# Patient Record
Sex: MALE | Race: WHITE
[De-identification: names, ages, dates, MRNs, and addresses within clinical notes are randomized per-mention and may not be internally consistent; named-entity substitution may affect disease eponyms.]

---

## 2018-02-23 ENCOUNTER — HOSPITAL ENCOUNTER (OUTPATIENT)
Dept: HOSPITAL 76 - EMS | Age: 78
Discharge: ANOTHER HOSPITAL | End: 2018-02-23
Attending: SURGERY
Payer: MEDICARE

## 2018-02-23 ENCOUNTER — HOSPITAL ENCOUNTER (EMERGENCY)
Dept: HOSPITAL 76 - ED | Age: 78
LOS: 1 days | Discharge: HOME | End: 2018-02-24
Payer: MEDICARE

## 2018-02-23 DIAGNOSIS — I10: ICD-10-CM

## 2018-02-23 DIAGNOSIS — R53.1: Primary | ICD-10-CM

## 2018-02-23 DIAGNOSIS — Z86.73: ICD-10-CM

## 2018-02-23 LAB
ALBUMIN DIAFP-MCNC: 3 G/DL (ref 3.2–5.5)
ALBUMIN/GLOB SERPL: 1 {RATIO} (ref 1–2.2)
ALP SERPL-CCNC: 43 IU/L (ref 42–121)
ALT SERPL W P-5'-P-CCNC: 10 IU/L (ref 10–60)
ANION GAP SERPL CALCULATED.4IONS-SCNC: 10 MMOL/L (ref 6–13)
AST SERPL W P-5'-P-CCNC: < 10 IU/L (ref 10–42)
BASOPHILS NFR BLD AUTO: 0 10^3/UL (ref 0–0.1)
BASOPHILS NFR BLD AUTO: 0.3 %
BILIRUB BLD-MCNC: 0.6 MG/DL (ref 0.2–1)
BUN SERPL-MCNC: 20 MG/DL (ref 6–20)
CALCIUM UR-MCNC: 9.9 MG/DL (ref 8.5–10.3)
CHLORIDE SERPL-SCNC: 99 MMOL/L (ref 101–111)
CLARITY UR REFRACT.AUTO: (no result)
CO2 SERPL-SCNC: 30 MMOL/L (ref 21–32)
CREAT SERPLBLD-SCNC: 1 MG/DL (ref 0.6–1.2)
EOSINOPHIL # BLD AUTO: 0 10^3/UL (ref 0–0.7)
EOSINOPHIL NFR BLD AUTO: 0.3 %
ERYTHROCYTE [DISTWIDTH] IN BLOOD BY AUTOMATED COUNT: 16.1 % (ref 12–15)
GFRSERPLBLD MDRD-ARVRAT: 73 ML/MIN/{1.73_M2} (ref 89–?)
GLOBULIN SER-MCNC: 3.1 G/DL (ref 2.1–4.2)
GLUCOSE SERPL-MCNC: 111 MG/DL (ref 70–100)
GLUCOSE UR QL STRIP.AUTO: NEGATIVE MG/DL
HGB UR QL STRIP: 11.2 G/DL (ref 14–18)
INR PPP: 1.1 (ref 0.8–1.2)
KETONES UR QL STRIP.AUTO: NEGATIVE MG/DL
LIPASE SERPL-CCNC: < 10 U/L (ref 22–51)
LYMPHOCYTES # SPEC AUTO: 0.4 10^3/UL (ref 1.5–3.5)
LYMPHOCYTES NFR BLD AUTO: 5.9 %
MCH RBC QN AUTO: 29.5 PG (ref 27–31)
MCHC RBC AUTO-ENTMCNC: 33 G/DL (ref 32–36)
MCV RBC AUTO: 89.3 FL (ref 80–94)
MONOCYTES # BLD AUTO: 0.8 10^3/UL (ref 0–1)
MONOCYTES NFR BLD AUTO: 11.4 %
NEUTROPHILS # BLD AUTO: 5.5 10^3/UL (ref 1.5–6.6)
NEUTROPHILS # SNV AUTO: 6.8 X10^3/UL (ref 4.8–10.8)
NEUTROPHILS NFR BLD AUTO: 82.1 %
NITRITE UR QL STRIP.AUTO: NEGATIVE
PDW BLD AUTO: 7 FL (ref 7.4–11.4)
PH UR STRIP.AUTO: 7 PH (ref 5–7.5)
PLATELET # BLD: 269 10^3/UL (ref 130–450)
PROT SPEC-MCNC: 6.1 G/DL (ref 6.7–8.2)
PROT UR STRIP.AUTO-MCNC: NEGATIVE MG/DL
PROTHROM ACT/NOR PPP: 12.6 SECS (ref 9.9–12.6)
RBC # UR STRIP.AUTO: (no result) /UL
RBC # URNS HPF: (no result) /HPF (ref 0–5)
RBC MAR: 3.8 10^6/UL (ref 4.7–6.1)
SODIUM SERPLBLD-SCNC: 139 MMOL/L (ref 135–145)
SP GR UR STRIP.AUTO: 1.01 (ref 1–1.03)
SQUAMOUS URNS QL MICRO: (no result)
UROBILINOGEN UR QL STRIP.AUTO: (no result) E.U./DL
UROBILINOGEN UR STRIP.AUTO-MCNC: NEGATIVE MG/DL

## 2018-02-23 PROCEDURE — 36415 COLL VENOUS BLD VENIPUNCTURE: CPT

## 2018-02-23 PROCEDURE — 83690 ASSAY OF LIPASE: CPT

## 2018-02-23 PROCEDURE — 99284 EMERGENCY DEPT VISIT MOD MDM: CPT

## 2018-02-23 PROCEDURE — 81003 URINALYSIS AUTO W/O SCOPE: CPT

## 2018-02-23 PROCEDURE — 70460 CT HEAD/BRAIN W/DYE: CPT

## 2018-02-23 PROCEDURE — 99285 EMERGENCY DEPT VISIT HI MDM: CPT

## 2018-02-23 PROCEDURE — 71046 X-RAY EXAM CHEST 2 VIEWS: CPT

## 2018-02-23 PROCEDURE — 80053 COMPREHEN METABOLIC PANEL: CPT

## 2018-02-23 PROCEDURE — 84484 ASSAY OF TROPONIN QUANT: CPT

## 2018-02-23 PROCEDURE — 51701 INSERT BLADDER CATHETER: CPT

## 2018-02-23 PROCEDURE — 87086 URINE CULTURE/COLONY COUNT: CPT

## 2018-02-23 PROCEDURE — 85610 PROTHROMBIN TIME: CPT

## 2018-02-23 PROCEDURE — 85025 COMPLETE CBC W/AUTO DIFF WBC: CPT

## 2018-02-23 PROCEDURE — 81001 URINALYSIS AUTO W/SCOPE: CPT

## 2018-02-23 NOTE — XRAY PRELIMINARY REPORT
Accession: [id number]

Exam: XR CHEST 2 VIEW X-RAY

 

IMPRESSION: 

1. Focal posterior airspace opacity best seen on the lateral view, possibly pneumonia or neoplasm. Fo
llow-up imaging is needed to show resolution. 

2. Borderline heart size.

 

[facility]

 

SITE ID: [id number]

## 2018-02-23 NOTE — CT REPORT
EXAM:

CT HEAD WITH CONTRAST

 

EXAM DATE: 2/23/2018 10:42 PM.

 

CLINICAL HISTORY: Weakness, altered mental status. History of lung cancer.

 

COMPARISON: MR brain 01/19/2013.

 

TECHNIQUE: Multiaxial CT images were obtained from the foramen magnum to the vertex. Reformats: Coron
al. IV contrast: 80 cc Isovue-300.

 

In accordance with CT protocol optimization, one or more of the following dose reduction techniques w
ere utilized for this exam: automated exposure control, adjustment of mA and/or KV based on patient s
ize, or use of iterative reconstructive technique.

 

FINDINGS:

Compared to 2013, interval placement of a right frontal  shunt. Catheter tip minimally protrudes in
to the left frontal horn. Ventricular size is unchanged.

 

Moderately extensive white matter hypodensity, likely small vessel ischemia. The extent is similar.

 

Old appearing lacunar infarct within the left adryan, this was a recent ischemic event on the prior MR.


 

No obvious hemorrhage. No space-occupying lesion, extracerebral fluid collection or CT evidence of ne
w infarct.

 

Rounded subcutaneous nodule in the right parietal scalp measures 18 mm in diameter, this appears to h
ave increased slightly in size. Prior measurements on the order of 13 mm.

 

IMPRESSION: 

Interval placement of a right frontal  shunt catheter, ventricular size is unchanged. 

 

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 No acute intracranial process identified. 

 

No abnormal enhancement to suggest metastatic disease.

 

Enlarging right subcutaneous nodule over the right parietal region, possible sebaceous cyst. Please c
nicole clinically.

 

[facility]

Referring Provider Line: [phone]

 

SITE ID: [id number]

## 2018-02-23 NOTE — ED PHYSICIAN DOCUMENTATION
History of Present Illness





- Stated complaint


Stated Complaint: WEAK





- Chief complaint


Chief Complaint: Neuro





- History obtained from


History obtained from: Patient, Family





- History of Present Illness


Timing: Enter  time (16:00), Today


Improved by: no ameliorating factors


Worsened by: no exacerbating factors





- Additonal information


Additional information: 





c/o generalized weakness that started approximately 4 PM today. Wife says 

patient usually can ambulate slowly on his own and get out of bed, but since 4 

PM, he has been unable to do either of these things due to generalized weakness

, most noticeable BLE. Seen by his rad/onc today and plan is to get MRI brain 

tomorrow (from wife's HPI, sounds like this was more for intermittent vomiting 

and possibly surveillance; he did not develop the weakness until after he met 

with his doctor today)





Review of Systems


Constitutional: denies: Fever, Chills, Sweats


Eyes: reports: Reviewed and negative


Cardiac: reports: Reviewed and negative


Respiratory: reports: Reviewed and negative


GI: reports: Nausea, Vomiting.  denies: Abdominal Pain, Constipation, Diarrhea


: denies: Dysuria, Frequency


Musculoskeletal: reports: Reviewed and negative


Neurologic: reports: Generalized weakness.  denies: Focal weakness, Numbness, 

Confused, Altered mental status, Headache





PD PAST MEDICAL HISTORY





- Past Medical History


Past Medical History: Yes


Cardiovascular: Hypertension


Respiratory: Pneumonia


Neuro: CVA


Psych: Depression


Musculoskeletal: Osteoarthritis, Rheumatoid arthritis





- Past Surgical History


Past Surgical History: Yes


Derm: Skin cancer surgery





- Present Medications


Home Medications: 


 Ambulatory Orders











 Medication  Instructions  Recorded  Confirmed


 


Atorvastatin [Lipitor] 20 mg PO DAILY 10/19/14 10/19/14


 


Calcium Carbonate/Vitamin D3 1 tab PO BID 10/19/14 10/19/14





[Calcium 500 + D Tablet]   


 


Citalopram [CeleXA] 20 mg PO DAILY 10/19/14 10/19/14


 


Desonide  10/19/14 10/19/14


 


Fluorouracil [Efudex]  10/19/14 10/19/14


 


Omeprazole [PriLOSEC] 10 mg DAILY 10/19/14 10/19/14


 


Potassium Chloride [K-Dur] 10 meq DAILY 10/19/14 10/19/14


 


predniSONE [Deltasone] 6 mg DAILY 10/19/14 10/19/14


 


Amlodipine Besylate 1 tab PO DAILY 02/23/18 02/23/18


 


Carvedilol 1 tab PO BID 02/23/18 02/23/18


 


Clopidogrel Bisulfate [Clopidogrel] 1 tab PO DAILY 02/23/18 02/23/18


 


Docusate Sodium 250Mg Capsule 1 cap PO DAILY 02/23/18 02/23/18





[Colace 250Mg Capsule]   


 


Fluticasone Propionate [Flovent 2 spray BETTYE DAILY 02/23/18 02/23/18





Diskus]   


 


Guaifenesin [Mucinex] 600 mg PO BID 02/23/18 02/23/18


 


Hydrocodone/Acetaminophen [Vicodin 1 tab PO BID PRN 02/23/18 02/23/18





5-300 mg Tablet]   


 


Ibandronate Sodium 1 tab PO 02/23/18 


 


Levothyroxine Sodium 1 tab PO DAILY 02/23/18 02/23/18


 


Lidocaine Patch 5% [Lidoderm Patch] 1 patch TOP DAILY 02/23/18 02/23/18


 


Mirabegron [Myrbetriq] 1 tab PO DAILY 02/23/18 02/23/18


 


Valsartan 1 tab PO BID 02/23/18 02/23/18


 


hydroCHLOROthiazide 1 tab PO DAILY 02/23/18 02/23/18





[Hydrochlorothiazide]   














- Allergies


Allergies/Adverse Reactions: 


 Allergies











Allergy/AdvReac Type Severity Reaction Status Date / Time


 


No Known Drug Allergies Allergy   Verified 02/23/18 20:02














- Social History


Does the pt smoke?: No


Smoking Status: Never smoker


Does the pt drink ETOH?: Yes


Does the pt have substance abuse?: No





- Immunizations


Immunizations are current?: Yes





- POLST


Patient has POLST: No





PD ED PE NORMAL





- Vitals


Vital signs reviewed: Yes





- General


General: Alert and oriented X 3, No acute distress, Well developed/nourished, 

Other (answers slowly but appropriately)





- HEENT


HEENT: PERRL, EOMI, Moist mucous membranes





- Neck


Neck: Supple, no meningeal sign





- Cardiac


Cardiac: RRR, No murmur, No gallop, No rub





- Respiratory


Respiratory: No respiratory distress, Clear bilaterally





- Abdomen


Abdomen: Soft, Non tender





- Back


Back: No CVA TTP





- Derm


Derm: Normal color, Warm and dry





- Neuro


Neuro: Alert and oriented X 3, CNs 2-12 intact, No motor deficit (5/5 bilateral 

plantarflexion, 4/5 bilateral iliopsoas (leg raise)), No sensory deficit, 

Normal speech, Other (2+/4 bilateral patellar DTR)


Eye Opening: Spontaneous


Motor: Obeys Commands


Verbal: Oriented


GCS Score: 15





Results





- Vitals


Vitals: 


 Oxygen











O2 Source                      Room air


 


Oxygen Flow Rate               2

















- Labs


Labs: 


 Microbiology











 02/23/18 20:50 Urine Culture - Final





 Urine,Catheterized    No growth








 Laboratory Tests











  02/23/18 02/23/18 02/23/18





  19:45 19:45 19:45


 


WBC  6.8  


 


RBC  3.80 L  


 


Hgb  11.2 L  


 


Hct  33.9 L  


 


MCV  89.3  


 


MCH  29.5  


 


MCHC  33.0  


 


RDW  16.1 H  


 


Plt Count  269  


 


MPV  7.0 L  


 


Neut #  5.5  


 


Lymph #  0.4 L  


 


Mono #  0.8  


 


Eos #  0.0  


 


Baso #  0.0  


 


Absolute Nucleated RBC  0.00  


 


Nucleated RBC %  0.1  


 


PT   12.6 


 


INR   1.1 


 


Sodium    139


 


Potassium    4.1


 


Chloride    99 L


 


Carbon Dioxide    30


 


Anion Gap    10.0


 


BUN    20


 


Creatinine    1.0


 


Estimated GFR (MDRD)    73 L


 


Glucose    111 H


 


Calcium    9.9


 


Total Bilirubin    0.6


 


AST    < 10 L


 


ALT    10


 


Alkaline Phosphatase    43


 


Troponin I   


 


Total Protein    6.1 L


 


Albumin    3.0 L


 


Globulin    3.1


 


Albumin/Globulin Ratio    1.0


 


Lipase    < 10 L


 


Urine Color   


 


Urine Clarity   


 


Urine pH   


 


Ur Specific Gravity   


 


Urine Protein   


 


Urine Glucose (UA)   


 


Urine Ketones   


 


Urine Occult Blood   


 


Urine Nitrite   


 


Urine Bilirubin   


 


Urine Urobilinogen   


 


Ur Leukocyte Esterase   


 


Urine RBC   


 


Urine WBC   


 


Ur Squamous Epith Cells   


 


Amorphous Sediment   


 


Urine Bacteria   


 


Ur Microscopic Review   


 


Urine Culture Comments   














  02/23/18 02/23/18





  19:45 20:50


 


WBC  


 


RBC  


 


Hgb  


 


Hct  


 


MCV  


 


MCH  


 


MCHC  


 


RDW  


 


Plt Count  


 


MPV  


 


Neut #  


 


Lymph #  


 


Mono #  


 


Eos #  


 


Baso #  


 


Absolute Nucleated RBC  


 


Nucleated RBC %  


 


PT  


 


INR  


 


Sodium  


 


Potassium  


 


Chloride  


 


Carbon Dioxide  


 


Anion Gap  


 


BUN  


 


Creatinine  


 


Estimated GFR (MDRD)  


 


Glucose  


 


Calcium  


 


Total Bilirubin  


 


AST  


 


ALT  


 


Alkaline Phosphatase  


 


Troponin I  < 0.04 


 


Total Protein  


 


Albumin  


 


Globulin  


 


Albumin/Globulin Ratio  


 


Lipase  


 


Urine Color   YELLOW


 


Urine Clarity   HAZY


 


Urine pH   7.0


 


Ur Specific Gravity   1.015


 


Urine Protein   NEGATIVE


 


Urine Glucose (UA)   NEGATIVE


 


Urine Ketones   NEGATIVE


 


Urine Occult Blood   TRACE-INTA


 


Urine Nitrite   NEGATIVE


 


Urine Bilirubin   NEGATIVE


 


Urine Urobilinogen   0.2 (NORMAL)


 


Ur Leukocyte Esterase   NEGATIVE


 


Urine RBC   0-5


 


Urine WBC   0-3


 


Ur Squamous Epith Cells   FEW Squamous


 


Amorphous Sediment   Moderate


 


Urine Bacteria   Moderate H


 


Ur Microscopic Review   INDICATED


 


Urine Culture Comments   INDICATED














- Rads (name of study)


  ** CT head w/contrast


Radiology: Prelim report reviewed, See rad report





  ** chest xray


Radiology: Prelim report reviewed, See rad report





PD MEDICAL DECISION MAKING





- ED course


Complexity details: reviewed results, re-evaluated patient, considered 

differential, d/w patient


ED course: 





No vomiting during ED stay. Tests do not reveal acute or apparently new 

pathology, no apparent etiology at this time for patient's weakness. After 

tests resulted and d/w patient and spouse, ED staff helped patient to stand and 

walk with walker. Spouse feels he is close enough to baseline for d/c home, and 

at this time there does not appear to be a benefit of, nor indication for, 

keeping patient in hospital (or transfer). 





Departure





- Departure


Disposition: 01 Home, Self Care


Clinical Impression: 


 Weakness





Condition: Good


Instructions:  ED Weakness [facility]


Discharge Date/Time: 02/24/18 00:08

## 2018-02-23 NOTE — XRAY REPORT
EXAM:

CHEST RADIOGRAPHY

 

EXAM DATE: 2/23/2018 10:48 PM.

 

CLINICAL HISTORY: Decreased mental status. Cough. Decreased breath sounds on the left.

 

COMPARISON: None.

 

TECHNIQUE: 2 views.

 

FINDINGS: 

Lungs/Pleura: Focal airspace opacity best seen posteriorly on the lateral view. This may be in the po
sterior segment of the upper lobe or superior segment of the lower lobe. Laterality is difficult to d
etermine but more likely on the right. No obvious pleural effusion seen. No pneumothorax.

 

Mediastinum: Heart size upper normal. Tortuous ectatic aorta.

 

Other: Osteopenia.  shunt tube overlies the right hemithorax.

 

IMPRESSION: 

1. Focal posterior airspace opacity best seen on the lateral view, possibly pneumonia or neoplasm. Fo
llow-up imaging is needed to show resolution. 

2. Borderline heart size.

 

[facility]

Referring Provider Line: [phone]

 

SITE ID: [id number]

## 2018-02-24 VITALS — DIASTOLIC BLOOD PRESSURE: 81 MMHG | SYSTOLIC BLOOD PRESSURE: 153 MMHG
